# Patient Record
Sex: FEMALE | Race: WHITE | NOT HISPANIC OR LATINO | Employment: FULL TIME | URBAN - METROPOLITAN AREA
[De-identification: names, ages, dates, MRNs, and addresses within clinical notes are randomized per-mention and may not be internally consistent; named-entity substitution may affect disease eponyms.]

---

## 2024-04-17 SDOH — HEALTH STABILITY: PHYSICAL HEALTH: ON AVERAGE, HOW MANY DAYS PER WEEK DO YOU ENGAGE IN MODERATE TO STRENUOUS EXERCISE (LIKE A BRISK WALK)?: 3 DAYS

## 2024-04-17 SDOH — HEALTH STABILITY: PHYSICAL HEALTH: ON AVERAGE, HOW MANY MINUTES DO YOU ENGAGE IN EXERCISE AT THIS LEVEL?: 30 MIN

## 2024-04-17 ASSESSMENT — SOCIAL DETERMINANTS OF HEALTH (SDOH): HOW OFTEN DO YOU GET TOGETHER WITH FRIENDS OR RELATIVES?: ONCE A WEEK

## 2024-04-18 ENCOUNTER — OFFICE VISIT (OUTPATIENT)
Dept: FAMILY MEDICINE | Facility: CLINIC | Age: 24
End: 2024-04-18
Payer: COMMERCIAL

## 2024-04-18 VITALS
HEART RATE: 85 BPM | TEMPERATURE: 97.1 F | DIASTOLIC BLOOD PRESSURE: 80 MMHG | OXYGEN SATURATION: 98 % | SYSTOLIC BLOOD PRESSURE: 120 MMHG | BODY MASS INDEX: 24.08 KG/M2 | WEIGHT: 149.8 LBS | RESPIRATION RATE: 16 BRPM | HEIGHT: 66 IN

## 2024-04-18 DIAGNOSIS — Z53.20 HIV SCREENING DECLINED: ICD-10-CM

## 2024-04-18 DIAGNOSIS — N20.0 KIDNEY STONE: ICD-10-CM

## 2024-04-18 DIAGNOSIS — Z11.59 NEED FOR HEPATITIS C SCREENING TEST: ICD-10-CM

## 2024-04-18 DIAGNOSIS — N92.0 MENORRHAGIA WITH REGULAR CYCLE: ICD-10-CM

## 2024-04-18 DIAGNOSIS — Z00.00 ROUTINE GENERAL MEDICAL EXAMINATION AT A HEALTH CARE FACILITY: Primary | ICD-10-CM

## 2024-04-18 DIAGNOSIS — Z11.59 NEED FOR HEPATITIS B SCREENING TEST: ICD-10-CM

## 2024-04-18 DIAGNOSIS — Z13.220 SCREENING FOR HYPERLIPIDEMIA: ICD-10-CM

## 2024-04-18 PROCEDURE — 99385 PREV VISIT NEW AGE 18-39: CPT

## 2024-04-18 NOTE — PROGRESS NOTES
Preventive Care Visit  [facility]  JAUN Kapadia CNP, Family Medicine  Apr 18, 2024      Assessment & Plan     Routine general medical examination at a health care facility    Menorrhagia with regular cycle  Chronic/historical prior to being on birth control pt had very heavy and painful periods that often caused missing school. Was on ENEDELIA for many years, then nexplannon during college. Wants removal of nexplanon, she is getting  in May and not wanting to prevent future pregnancy.  Discussed her history of heavy painful, if pain full periods return and if  difficulty when trying for pregnancy, I recommend seeing  OB/GYN as things like fibroids, endometriosis, PCOS can cause symptoms and affect fertility.  - Hemoglobin    Kidney stone  Historical, multiple kidney stones in the past. No current symptoms. Will get labs since none prior in records.   - Comprehensive metabolic panel (BMP + Alb, Alk Phos, ALT, AST, Total. Bili, TP)    Screening for hyperlipidemia  Family history.  - Lipid panel reflex to direct LDL Non-fasting    HIV screening   Works in healthcare.  - HIV Antigen Antibody Combo    Need for hepatitis B screening test  Works in healthcare.  - Hepatitis B Surface Antibody    Need for hepatitis C screening test  Works in healthcare.  - Hepatitis C Screen Reflex to HCV RNA Quant and Genotype      Patient has been advised of split billing requirements and indicates understanding: Yes          Counseling  Appropriate preventive services were discussed with this patient, including applicable screening as appropriate for fall prevention, nutrition, physical activity, Tobacco-use cessation, weight loss and cognition.  Checklist reviewing preventive services available has been given to the patient.  Reviewed patient's diet, addressing concerns and/or questions.   She is at risk for lack of exercise and has been provided with information to increase physical activity for the  benefit of her well-being.       See Patient Instructions    Gideon Celeste is a 23 year old, presenting for the following:  Physical        4/18/2024     8:01 AM   Additional Questions   Roomed by Jesusita LEMUS MA        Via the Health Maintenance questionnaire, the patient has reported the following services have been completed -Cervical Cancer Screening, this information has been sent to the abstraction team.  Health Care Directive  Patient does not have a Health Care Directive or Living Will: Discussed advance care planning with patient; information given to patient to review.    HPI    Pap smear done 2022 in July, [place], [facility], normal results.  Pt has nexaplanon since July 2022, since last pap smear.    She's been on birth control since middle school and getting  in May so not needing to prevent pregnancy.     Discuss removing nexaplanon soon, notified provider does not remove.   Pt does not want to start another contraception.   Declines STI testing.           4/17/2024   General Health   How would you rate your overall physical health? Good   Feel stress (tense, anxious, or unable to sleep) To some extent   (!) STRESS CONCERN      4/17/2024   Nutrition   Three or more servings of calcium each day? (!) I DON'T KNOW   Diet: Regular (no restrictions)   How many servings of fruit and vegetables per day? (!) 2-3   How many sweetened beverages each day? 0-1         4/17/2024   Exercise   Days per week of moderate/strenous exercise 3 days   Average minutes spent exercising at this level 30 min         4/17/2024   Social Factors   Frequency of gathering with friends or relatives Once a week   Worry food won't last until get money to buy more No   Food not last or not have enough money for food? No   Do you have housing?  Yes   Are you worried about losing your housing? No   Lack of transportation? No   Unable to get utilities (heat,electricity)? No         4/17/2024   Dental   Dentist two times  every year? Yes         4/17/2024   TB Screening   Were you born outside of the [place]? No         Today's PHQ-2 Score:       4/17/2024     9:10 AM   PHQ-2 ( 1999 [employer])   Q1: Little interest or pleasure in doing things 1   Q2: Feeling down, depressed or hopeless 0   PHQ-2 Score 1   Q1: Little interest or pleasure in doing things Several days   Q2: Feeling down, depressed or hopeless Not at all   PHQ-2 Score 1           4/17/2024   Substance Use   Alcohol more than 3/day or more than 7/wk No   Do you use any other substances recreationally? No     Social History     Tobacco Use    Smoking status: Never    Smokeless tobacco: Never   Substance Use Topics    Alcohol use: Yes    Drug use: Never             4/17/2024   One time HIV Screening   Previous HIV test? No         4/17/2024   STI Screening   New sexual partner(s) since last STI/HIV test? No     History of abnormal Pap smear: NO - age 21-29 PAP every 3 years recommended             4/17/2024   Contraception/Family Planning   Questions about contraception or family planning (!) YES wondering if she needs to start hormonal birth control to wean off nexplanon.        Reviewed and updated as needed this visit by Provider        Surg Hx  Fam Hx   Sexual Activity          Past Medical History:   Diagnosis Date    Spondylolisthesis of lumbar region     spinal fusion 2014, 2016     Past Surgical History:   Procedure Laterality Date    ENT SURGERY  2016    SPINAL FUSION      L1 spondylolithesis     OB History   No obstetric history on file.     BP Readings from Last 3 Encounters:   04/18/24 120/80    Wt Readings from Last 3 Encounters:   04/18/24 67.9 kg (149 lb 12.8 oz)            Patient Active Problem List   Diagnosis    Spondylolisthesis at L5-S1 level    Kidney stone     Past Surgical History:   Procedure Laterality Date    ENT SURGERY  2016    SPINAL FUSION      L1 spondylolithesis       Social History     Tobacco Use    Smoking status: Never    Smokeless tobacco:  "Never   Substance Use Topics    Alcohol use: Yes     Family History   Problem Relation Age of Onset    Hypertension Father     Hyperlipidemia Father     Breast Cancer No family hx of     Colon Cancer No family hx of     Thyroid Disease No family hx of     Autoimmune Disease No family hx of          Current Outpatient Medications   Medication Sig Dispense Refill    etonogestrel (NEXPLANON) 68 MG IMPL Inject 1 each Subcutaneous once       Allergies   Allergen Reactions    Hydromorphone Itching    Morphine Itching     No lab results found.       Review of Systems  Constitutional, HEENT, cardiovascular, pulmonary, gi and gu systems are negative, except as otherwise noted.     Objective    Exam  /80 (BP Location: Left arm, Patient Position: Chair, Cuff Size: Adult Regular)   Pulse 85   Temp 97.1  F (36.2  C) (Oral)   Resp 16   Ht 1.676 m (5' 6\")   Wt 67.9 kg (149 lb 12.8 oz)   LMP 04/16/2024 (Exact Date)   SpO2 98%   Breastfeeding No   BMI 24.18 kg/m     Estimated body mass index is 24.18 kg/m  as calculated from the following:    Height as of this encounter: 1.676 m (5' 6\").    Weight as of this encounter: 67.9 kg (149 lb 12.8 oz).    Physical Exam  GENERAL: alert and no distress  NECK: no adenopathy, no asymmetry, masses, or scars  RESP: lungs clear to auscultation - no rales, rhonchi or wheezes  CV: regular rate and rhythm, normal S1 S2, no S3 or S4, no murmur, click or rub, no peripheral edema  ABDOMEN: soft, nontender, no hepatosplenomegaly, no masses and bowel sounds normal  MS: no gross musculoskeletal defects noted, no edema  SKIN: no suspicious lesions or rashes  NEURO: Normal strength and tone, mentation intact and speech normal  PSYCH: mentation appears normal, affect normal/bright        Signed Electronically by: JAUN Kapadia CNP    "

## 2024-04-18 NOTE — PATIENT INSTRUCTIONS
"Vance Celeste,     It was nice meeting you today. Here is a reminder of the plan we discussed:    ++ I forgot to have you fill out an CLARENCE form to get your previous records. Please ask the  for an CLARENCE form when you come in for your LAB appointment, fill out with you prior clinic info and we will send the request. ++    Schedule Nexplanon removal for June, usually booked a few months out because the appointment is booked for 60 minutes instead of standard 30.   - Dr. Herzog and Carmen Yee are family practice providers who provide this service. You do not need a separate consult prior unless you wanted it replaced.    Labs - schedule a lab appointment to get blood drawn. The results will be on [email or web address].     Reminders for future visits:  E-visit = online questionnaire only, we reply within 24 hours if submitted on a weekday. Great for quick things like UTI, yeast/BV, wanting strep test, sinus infection etc.   Virtual = phone or video visit, usually we can put some orders in, discuss a workup plan, and have you follow up in person if needed. We have appointments available within 1-2 weeks.  Nurse triage = if you have urgent or new/concerning symptoms you can call the main line, ask to speak with a nurse, they will review your symptoms and help you get an appointment within an appropriate time frame (we have \"same-day\" and \"next-day\" appointments held every week for nurses to schedule) or direct you to urgent care/ER if needed based on situation.          Preventive Care Advice   This is general advice given by our system to help you stay healthy. However, your care team may have specific advice just for you. Please talk to your care team about your preventive care needs.  Nutrition  Eat 5 or more servings of fruits and vegetables each day.  Try wheat bread, brown rice and whole grain pasta (instead of white bread, rice, and pasta).  Get enough calcium and vitamin D. Check the label on foods and aim for 100% of " the RDA (recommended daily allowance).  Lifestyle  Exercise at least 150 minutes each week   (30 minutes a day, 5 days a week).  Do muscle strengthening activities 2 days a week. These help control your weight and prevent disease.  No smoking.  Wear sunscreen to prevent skin cancer.  Have a dental exam and cleaning every 6 months.  Yearly exams  See your health care team every year to talk about:  Any changes in your health.  Any medicines your care team has prescribed.  Preventive care, family planning, and ways to prevent chronic diseases.  Shots (vaccines)   HPV shots (up to age 26), if you've never had them before.  Hepatitis B shots (up to age 59), if you've never had them before.  COVID-19 shot: Get this shot when it's due.  Flu shot: Get a flu shot every year.  Tetanus shot: Get a tetanus shot every 10 years.  Pneumococcal, hepatitis A, and RSV shots: Ask your care team if you need these based on your risk.  Shingles shot (for age 50 and up).  General health tests  Diabetes screening:  Starting at age 35, Get screened for diabetes at least every 3 years.  If you are younger than age 35, ask your care team if you should be screened for diabetes.  Cholesterol test: At age 39, start having a cholesterol test every 5 years, or more often if advised.  Bone density scan (DEXA): At age 50, ask your care team if you should have this scan for osteoporosis (brittle bones).  Hepatitis C: Get tested at least once in your life.  STIs (sexually transmitted infections)  Before age 24: Ask your care team if you should be screened for STIs.  After age 24: Get screened for STIs if you're at risk. You are at risk for STIs (including HIV) if:  You are sexually active with more than one person.  You don't use condoms every time.  You or a partner was diagnosed with a sexually transmitted infection.  If you are at risk for HIV, ask about PrEP medicine to prevent HIV.  Get tested for HIV at least once in your life, whether you are  at risk for HIV or not.  Cancer screening tests  Cervical cancer screening: If you have a cervix, begin getting regular cervical cancer screening tests at age 21. Most people who have regular screenings with normal results can stop after age 65. Talk about this with your provider.  Breast cancer scan (mammogram): If you've ever had breasts, begin having regular mammograms starting at age 40. This is a scan to check for breast cancer.  Colon cancer screening: It is important to start screening for colon cancer at age 45.  Have a colonoscopy test every 10 years (or more often if you're at risk) Or, ask your provider about stool tests like a FIT test every year or Cologuard test every 3 years.  To learn more about your testing options, visit: https://www.Toxic Attire/052395.pdf.  For help making a decision, visit: https://Cuedd.Baydin/lx98284.  Prostate cancer screening test: If you have a prostate and are age 55 to 69, ask your provider if you would benefit from a yearly prostate cancer screening test.  Lung cancer screening: If you are a current or former smoker age 50 to 80, ask your care team if ongoing lung cancer screenings are right for you.  For informational purposes only. Not to replace the advice of your health care provider. Copyright   2023 [facility]. All rights reserved. Clinically reviewed by the [facility]. [employer] 420495 - REV 01/24.    Learning About Stress  What is stress?     Stress is your body's response to a hard situation. Your body can have a physical, emotional, or mental response. Stress is a fact of life for most people, and it affects everyone differently. What causes stress for you may not be stressful for someone else.  A lot of things can cause stress. You may feel stress when you go on a job interview, take a test, or run a race. This kind of short-term stress is normal and even useful. It can help you if you need to work hard or react quickly. For  example, stress can help you finish an important job on time.  Long-term stress is caused by ongoing stressful situations or events. Examples of long-term stress include long-term health problems, ongoing problems at work, or conflicts in your family. Long-term stress can harm your health.  How does stress affect your health?  When you are stressed, your body responds as though you are in danger. It makes hormones that speed up your heart, make you breathe faster, and give you a burst of energy. This is called the fight-or-flight stress response. If the stress is over quickly, your body goes back to normal and no harm is done.  But if stress happens too often or lasts too long, it can have bad effects. Long-term stress can make you more likely to get sick, and it can make symptoms of some diseases worse. If you tense up when you are stressed, you may develop neck, shoulder, or low back pain. Stress is linked to high blood pressure and heart disease.  Stress also harms your emotional health. It can make you patterson, tense, or depressed. Your relationships may suffer, and you may not do well at work or school.  What can you do to manage stress?  You can try these things to help manage stress:   Do something active. Exercise or activity can help reduce stress. Walking is a great way to get started. Even everyday activities such as housecleaning or yard work can help.  Try yoga or kevin chi. These techniques combine exercise and meditation. You may need some training at first to learn them.  Do something you enjoy. For example, listen to music or go to a movie. Practice your hobby or do volunteer work.  Meditate. This can help you relax, because you are not worrying about what happened before or what may happen in the future.  Do guided imagery. Imagine yourself in any setting that helps you feel calm. You can use online videos, books, or a teacher to guide you.  Do breathing exercises. For example:  From a standing  "position, bend forward from the waist with your knees slightly bent. Let your arms dangle close to the floor.  Breathe in slowly and deeply as you return to a standing position. Roll up slowly and lift your head last.  Hold your breath for just a few seconds in the standing position.  Breathe out slowly and bend forward from the waist.  Let your feelings out. Talk, laugh, cry, and express anger when you need to. Talking with supportive friends or family, a counselor, or a martin leader about your feelings is a healthy way to relieve stress. Avoid discussing your feelings with people who make you feel worse.  Write. It may help to write about things that are bothering you. This helps you find out how much stress you feel and what is causing it. When you know this, you can find better ways to cope.  What can you do to prevent stress?  You might try some of these things to help prevent stress:  Manage your time. This helps you find time to do the things you want and need to do.  Get enough sleep. Your body recovers from the stresses of the day while you are sleeping.  Get support. Your family, friends, and community can make a difference in how you experience stress.  Limit your news feed. Avoid or limit time on social media or news that may make you feel stressed.  Do something active. Exercise or activity can help reduce stress. Walking is a great way to get started.  Where can you learn more?  Go to https://www.Vanderdroid.net/patiented  Enter N032 in the search box to learn more about \"Learning About Stress.\"  Current as of: October 24, 2023               Content Version: 14.0    6801-9145 [employer].   Care instructions adapted under license by your healthcare professional. If you have questions about a medical condition or this instruction, always ask your healthcare professional. [employer] disclaims any warranty or liability for your use of this information.      "

## 2024-04-19 ENCOUNTER — LAB (OUTPATIENT)
Dept: LAB | Facility: CLINIC | Age: 24
End: 2024-04-19
Payer: COMMERCIAL

## 2024-04-19 DIAGNOSIS — Z11.59 NEED FOR HEPATITIS C SCREENING TEST: ICD-10-CM

## 2024-04-19 DIAGNOSIS — Z53.20 HIV SCREENING DECLINED: ICD-10-CM

## 2024-04-19 DIAGNOSIS — Z11.3 SCREENING FOR STDS (SEXUALLY TRANSMITTED DISEASES): Primary | ICD-10-CM

## 2024-04-19 DIAGNOSIS — N92.0 MENORRHAGIA WITH REGULAR CYCLE: ICD-10-CM

## 2024-04-19 DIAGNOSIS — Z11.59 NEED FOR HEPATITIS B SCREENING TEST: ICD-10-CM

## 2024-04-19 DIAGNOSIS — N20.0 KIDNEY STONE: ICD-10-CM

## 2024-04-19 DIAGNOSIS — Z13.220 SCREENING FOR HYPERLIPIDEMIA: ICD-10-CM

## 2024-04-19 LAB — HGB BLD-MCNC: 14.2 G/DL (ref 11.7–15.7)

## 2024-04-19 PROCEDURE — 86706 HEP B SURFACE ANTIBODY: CPT

## 2024-04-19 PROCEDURE — 87591 N.GONORRHOEAE DNA AMP PROB: CPT

## 2024-04-19 PROCEDURE — 36415 COLL VENOUS BLD VENIPUNCTURE: CPT

## 2024-04-19 PROCEDURE — 85018 HEMOGLOBIN: CPT

## 2024-04-19 PROCEDURE — 80061 LIPID PANEL: CPT

## 2024-04-19 PROCEDURE — 87389 HIV-1 AG W/HIV-1&-2 AB AG IA: CPT

## 2024-04-19 PROCEDURE — 86803 HEPATITIS C AB TEST: CPT

## 2024-04-19 PROCEDURE — 87491 CHLMYD TRACH DNA AMP PROBE: CPT

## 2024-04-19 PROCEDURE — 80053 COMPREHEN METABOLIC PANEL: CPT

## 2024-04-20 LAB
C TRACH DNA SPEC QL PROBE+SIG AMP: NEGATIVE
N GONORRHOEA DNA SPEC QL NAA+PROBE: NEGATIVE

## 2024-04-21 LAB
ALBUMIN SERPL BCG-MCNC: 4.5 G/DL (ref 3.5–5.2)
ALP SERPL-CCNC: 84 U/L (ref 40–150)
ALT SERPL W P-5'-P-CCNC: 14 U/L (ref 0–50)
ANION GAP SERPL CALCULATED.3IONS-SCNC: 12 MMOL/L (ref 7–15)
AST SERPL W P-5'-P-CCNC: 14 U/L (ref 0–45)
BILIRUB SERPL-MCNC: 0.7 MG/DL
BUN SERPL-MCNC: 15.7 MG/DL (ref 6–20)
CALCIUM SERPL-MCNC: 9.3 MG/DL (ref 8.6–10)
CHLORIDE SERPL-SCNC: 105 MMOL/L (ref 98–107)
CHOLEST SERPL-MCNC: 157 MG/DL
CREAT SERPL-MCNC: 0.92 MG/DL (ref 0.51–0.95)
DEPRECATED HCO3 PLAS-SCNC: 23 MMOL/L (ref 22–29)
EGFRCR SERPLBLD CKD-EPI 2021: 89 ML/MIN/1.73M2
FASTING STATUS PATIENT QL REPORTED: YES
GLUCOSE SERPL-MCNC: 86 MG/DL (ref 70–99)
HBV SURFACE AB SERPL IA-ACNC: >1000 M[IU]/ML
HBV SURFACE AB SERPL IA-ACNC: REACTIVE M[IU]/ML
HCV AB SERPL QL IA: NONREACTIVE
HDLC SERPL-MCNC: 46 MG/DL
HIV 1+2 AB+HIV1 P24 AG SERPL QL IA: NONREACTIVE
LDLC SERPL CALC-MCNC: 93 MG/DL
NONHDLC SERPL-MCNC: 111 MG/DL
POTASSIUM SERPL-SCNC: 4.4 MMOL/L (ref 3.4–5.3)
PROT SERPL-MCNC: 6.9 G/DL (ref 6.4–8.3)
SODIUM SERPL-SCNC: 140 MMOL/L (ref 135–145)
TRIGL SERPL-MCNC: 92 MG/DL

## 2024-06-20 ENCOUNTER — OFFICE VISIT (OUTPATIENT)
Dept: OBGYN | Facility: CLINIC | Age: 24
End: 2024-06-20
Payer: COMMERCIAL

## 2024-06-20 VITALS
BODY MASS INDEX: 25.02 KG/M2 | HEART RATE: 84 BPM | WEIGHT: 155 LBS | SYSTOLIC BLOOD PRESSURE: 127 MMHG | TEMPERATURE: 98.1 F | DIASTOLIC BLOOD PRESSURE: 89 MMHG

## 2024-06-20 DIAGNOSIS — Z30.46 ENCOUNTER FOR NEXPLANON REMOVAL: Primary | ICD-10-CM

## 2024-06-20 PROCEDURE — 11982 REMOVE DRUG IMPLANT DEVICE: CPT | Performed by: OBSTETRICS & GYNECOLOGY

## 2024-06-20 NOTE — PROGRESS NOTES
Nexplanon Removal:     Is a pregnancy test required: No.  Was a consent obtained?  Yes    Roula Vallejo is here for removal of etonogestrel implant Nexplanon/Implanon    Indication: desires change in method - NFP. Ok with pregnancy if it happens. Will start PNV.      Preoperative Diagnosis: etonogestrel implant  Postoperative Diagnosis: etonogestrel implant removed    Technique: On the left arm  Skin prep Betadine  Anesthesia 1% lidocaine  Procedure: Small incision (<5mm) was made at distal end of palpable implant, scalpel tip was used to isolate the implant and bring it to the incision, the fibrous capsule containing the implant  was incised and the Implant was removed intact.      EBL: minimal  Complications:  No  Tolerance:  Pt tolerated procedure well and was in stable condition.   Dressing:    A pressure bandage was placed for the next 12-24 hours.    Contraception was discussed and patient chose the following method natural family planning      Follow up: Pt was instructed to call if bleeding, severe pain or foul smell.     Ana Barr MD

## 2024-11-26 ENCOUNTER — VIRTUAL VISIT (OUTPATIENT)
Dept: OBGYN | Facility: CLINIC | Age: 24
End: 2024-11-26
Payer: COMMERCIAL

## 2024-11-26 VITALS — BODY MASS INDEX: 25.02 KG/M2 | HEIGHT: 66 IN

## 2024-11-26 DIAGNOSIS — Z23 NEED FOR DIPHTHERIA-TETANUS-PERTUSSIS (TDAP) VACCINE: ICD-10-CM

## 2024-11-26 DIAGNOSIS — Z34.00 ENCOUNTER FOR SUPERVISION OF NORMAL FIRST PREGNANCY: Primary | ICD-10-CM

## 2024-11-26 NOTE — PROGRESS NOTES
Important Information for Provider:     New ob nurse intake by phone, first pregnancy. Handouts reviewed. Discussed genetic screening. Ultrasound and NOB with CNM 12/11/2024  Discussed normal pregnancy symptoms.      Caffeine intake/servings daily - 1  Calcium intake/servings daily - 3  Exercise 5 times weekly - describe ; jogs, precautions given  Sunscreen used - Yes  Seatbelts used - Yes  Self Breast Exam - Yes  Pap test up to date -  Yes  Dental exam up to date -  Yes  Immunizations reviewed and up to date - Yes  Abuse: Current or Past (Physical, Sexual or Emotional) - No  Do you feel safe in your environment - Yes  Do you cope well with stress - Yes  Do you suffer from insomnia - Yes            Prenatal OB Questionnaire  Patient supplied answers from flow sheet for:  Prenatal OB Questionnaire.  Past Medical History  Have you ever recieved care for your mental health? : (!) (Patient-Rptd) Yes  Have you ever been in a major accident or suffered serious trauma?: (Patient-Rptd) No  Within the last year, has anyone hit, slapped, kicked or otherwise hurt you?: (Patient-Rptd) No  In the last year, has anyone forced you to have sex when you didn't want to?: (Patient-Rptd) No    Past Medical History 2   Have you ever received a blood transfusion?: (Patient-Rptd) No  Would you accept a blood transfusion if was medically recommended?: (Patient-Rptd) Yes  Does anyone in your home smoke?: (Patient-Rptd) No   Is your blood type Rh negative?: (Patient-Rptd) Unknown  Have you ever ?: (Patient-Rptd) No  Have you been hospitalized for a nonsurgical reason excluding normal delivery?: (Patient-Rptd) No  Have you ever had an abnormal pap smear?: (Patient-Rptd) No    Past Medical History (Continued)  Do you have a history of abnormalities of the uterus?: (Patient-Rptd) No  Did your mother take JUAN CARLOS or any other hormones when she was pregnant with you?: (Patient-Rptd) Unknown  Do you have any other problems we have not asked  about which you feel may be important to this pregnancy?: (Patient-Rptd) No                     Allergies as of 11/26/2024:    Allergies as of 11/26/2024 - Reviewed 11/26/2024   Allergen Reaction Noted    Hydromorphone Itching 12/27/2016    Morphine Itching 12/27/2016               Early ultrasound screening tool:    Does patient have irregular periods?  No  Did patient use hormonal birth control in the three months prior to positive urine pregnancy test? No  Is the patient breastfeeding?  No  Is the patient 10 weeks or greater at time of education visit?  No

## 2024-12-10 LAB
ABO/RH(D): ABNORMAL
ANTIBODY SCREEN: POSITIVE
SPECIMEN EXPIRATION DATE: ABNORMAL

## 2024-12-10 ASSESSMENT — EDINBURGH POSTNATAL DEPRESSION SCALE (EPDS)
I HAVE BLAMED MYSELF UNNECESSARILY WHEN THINGS WENT WRONG: NOT VERY OFTEN
I HAVE BEEN SO UNHAPPY THAT I HAVE HAD DIFFICULTY SLEEPING: NOT VERY OFTEN
I HAVE FELT SCARED OR PANICKY FOR NO GOOD REASON: YES, QUITE A LOT
I HAVE FELT SAD OR MISERABLE: NO, NOT AT ALL
THINGS HAVE BEEN GETTING ON TOP OF ME: YES, SOMETIMES I HAVEN'T BEEN COPING AS WELL AS USUAL
THE THOUGHT OF HARMING MYSELF HAS OCCURRED TO ME: NEVER
I HAVE BEEN ANXIOUS OR WORRIED FOR NO GOOD REASON: YES, VERY OFTEN
I HAVE BEEN SO UNHAPPY THAT I HAVE BEEN CRYING: NO, NEVER
I HAVE BEEN ABLE TO LAUGH AND SEE THE FUNNY SIDE OF THINGS: AS MUCH AS I ALWAYS COULD
I HAVE LOOKED FORWARD WITH ENJOYMENT TO THINGS: AS MUCH AS I EVER DID
TOTAL SCORE: 10

## 2024-12-11 ENCOUNTER — TRANSCRIBE ORDERS (OUTPATIENT)
Dept: MATERNAL FETAL MEDICINE | Facility: CLINIC | Age: 24
End: 2024-12-11

## 2024-12-11 ENCOUNTER — PRENATAL OFFICE VISIT (OUTPATIENT)
Dept: MIDWIFE SERVICES | Facility: CLINIC | Age: 24
End: 2024-12-11
Payer: COMMERCIAL

## 2024-12-11 ENCOUNTER — ANCILLARY PROCEDURE (OUTPATIENT)
Dept: ULTRASOUND IMAGING | Facility: CLINIC | Age: 24
End: 2024-12-11
Payer: COMMERCIAL

## 2024-12-11 VITALS
WEIGHT: 154.8 LBS | SYSTOLIC BLOOD PRESSURE: 130 MMHG | HEART RATE: 90 BPM | OXYGEN SATURATION: 98 % | BODY MASS INDEX: 24.99 KG/M2 | DIASTOLIC BLOOD PRESSURE: 90 MMHG

## 2024-12-11 DIAGNOSIS — Z34.00 ENCOUNTER FOR SUPERVISION OF NORMAL FIRST PREGNANCY: ICD-10-CM

## 2024-12-11 DIAGNOSIS — Z34.91 PRENATAL CARE IN FIRST TRIMESTER: Primary | ICD-10-CM

## 2024-12-11 DIAGNOSIS — O26.90 PREGNANCY RELATED CONDITION, ANTEPARTUM: Primary | ICD-10-CM

## 2024-12-11 DIAGNOSIS — F41.9 ANXIETY: ICD-10-CM

## 2024-12-11 DIAGNOSIS — R03.0 ELEVATED BLOOD PRESSURE READING WITHOUT DIAGNOSIS OF HYPERTENSION: ICD-10-CM

## 2024-12-11 LAB
ALBUMIN UR-MCNC: NEGATIVE MG/DL
APPEARANCE UR: CLEAR
BILIRUB UR QL STRIP: NEGATIVE
COLOR UR AUTO: YELLOW
ERYTHROCYTE [DISTWIDTH] IN BLOOD BY AUTOMATED COUNT: 11.9 % (ref 10–15)
EST. AVERAGE GLUCOSE BLD GHB EST-MCNC: 105 MG/DL
GLUCOSE UR STRIP-MCNC: NEGATIVE MG/DL
HBA1C MFR BLD: 5.3 % (ref 0–5.6)
HBV SURFACE AG SERPL QL IA: NONREACTIVE
HCT VFR BLD AUTO: 39.9 % (ref 35–47)
HCV AB SERPL QL IA: NONREACTIVE
HGB BLD-MCNC: 14.5 G/DL (ref 11.7–15.7)
HGB UR QL STRIP: NEGATIVE
HIV 1+2 AB+HIV1 P24 AG SERPL QL IA: NONREACTIVE
KETONES UR STRIP-MCNC: NEGATIVE MG/DL
LEUKOCYTE ESTERASE UR QL STRIP: ABNORMAL
MCH RBC QN AUTO: 31.3 PG (ref 26.5–33)
MCHC RBC AUTO-ENTMCNC: 36.3 G/DL (ref 31.5–36.5)
MCV RBC AUTO: 86 FL (ref 78–100)
NITRATE UR QL: NEGATIVE
PH UR STRIP: 5.5 [PH] (ref 5–7)
PLATELET # BLD AUTO: 231 10E3/UL (ref 150–450)
RBC # BLD AUTO: 4.63 10E6/UL (ref 3.8–5.2)
RUBV IGG SERPL QL IA: 5.73 INDEX
RUBV IGG SERPL QL IA: POSITIVE
SP GR UR STRIP: <=1.005 (ref 1–1.03)
T PALLIDUM AB SER QL: NONREACTIVE
UROBILINOGEN UR STRIP-ACNC: 0.2 E.U./DL
WBC # BLD AUTO: 10.8 10E3/UL (ref 4–11)

## 2024-12-11 PROCEDURE — 76817 TRANSVAGINAL US OBSTETRIC: CPT | Performed by: OBSTETRICS & GYNECOLOGY

## 2024-12-11 NOTE — PROGRESS NOTES
is a partnered  1 para 0 of  giving an EDC by Constance's rule of 25 who presents for a new OB Visit.   She has not had bleeding since her LMP.  She has not had nausea.. Weigh loss   has not occurred.. She denies fever, chills and viral infections since her last LMP.  There is mildfatigue.  This was a planned pregnancy.  She is not a previous CNM patient.  FOANURAG says that he has a history of juvenile arthritis and other autoimmune diseases in his family.  She reports having anxiety. She has taking medication in past.  She denies a history of hypertension but thinks that she could have white coat syndrome.   =========================================    INFECTION HISTORY  HIV: no  Hepatitis B: no  Hepatitis C: no  Tuberculosis: no    Herpes self: no  Herpes partner:  no   Chlamydia: no, declines testing - recent negative test  Gonorrhea: no, declines testing - recent negative test  Trichomonis: no  Syphilis:  no  ============================================    Low Dose Aspirin for Preeclampsia Prevention  Assessment:    Consider for those with 1 high risk or 2  moderate risk factors      High risk: previous pregnancy with preeclampsia, multifetal gestation, chronic htn, diabetes, chronic kidney disease, autoimmune disorder      Medium risk: nulliparity, BMI >30, family hx preeclampsia, age >/= 35,  race, low SES, personal risk factors (hx low birth weight, hx stillbirth, >10yrs between pregnancies).      Patient does qualify for low dose aspirin therapy    PERSONAL/SOCIAL HISTORY  Lives with partner.  Employment: Full time - RN night shift   =============================================    REVIEW OF SYSTEMS  CONSTITUTIONAL: Denies fever, chills and night sweats  DIET: Appetite is continue.  Eats a regular.   Plans to gain 25-35 pounds with her pregnancy.  SKIN: Denies changes and suspicious moles or lesions.  ENT: Denies blurred vision, hearing loss, tinnitus, frequent colds, epistaxis,  hoarseness and tooth pain.    ENDOCRINE: Denies heat and cold intolerance, and polydypsia.    BREASTS:  Tender since LMP.  Denies discharge and lumps.   HEART/LUNGS: Denies dyspnea, wheezing, coughing and chest pain.  HEMATOLOGIC: Denies tendency to bruise and history of blood clots.  GASTROINTESTINAL: Denies heartburn, indigestion and change of color in stools.    GENITOURINARY:  Denies urgency, dysuria and hematuria.  Has frequency of urination since LMP.   NEUROLOGIC:  Denies seizures, weakness and fainting.  PSYCHIATRIC:  Reports a history of anxiety and current struggles with anxiety.    ===========================================    PHYSICAL EXAM  GENERAL:   pleasant pregnant female, alert, cooperative and well groomed.  SKIN:  Warm and dry, without lesions or tenderness.    HEAD: Symmetrical features.  EYES:  PERRLA  EARS: Tympanic membranes gray, translucent and intact.  NOSE: No flaring, patent  MOUTH:  Buccal mucosa pink, moist without lesions.  Teeth in good repair.    NECK:  Thyroid without enlargement and nodules.  Lymph nodes not palpable.   LUNGS:  Clear to auscultation.  BREAST:  Symmetrical without lesions or nodes.  Nipples everted.  Areolas symmetric.  No palpable axillary nodes.  HEART:  RRR without murmur.  ABDOMEN: Soft without masses or tenderness.  No CVA tenderness.  Uterus palpable at size equal to dates.   MUSCULOSKELETAL:  Full range of motion  GENITALIA:  Deferred   EXTREMITIES:  2+/2+ DTR, No edema. No significant varicosities.  ===================================================    PLAN:  Elevated BP - possible chronic hypertension, continue to assess.    Labs today.    Reviewed preliminary US results.  Discussed anxiety.  She declines any referrals for therapy and or medication.  She said that she will let us know if she needs assistance.   Instructed on use of triage nurse line and contacting the on call CNM after hours in an emergency.    Discussed the indications, uses for and  false positives for genetic testing and fetal survey ultrasounds at 18-20 weeks gestation.  They do want M First Trimester screen. Ordered.   Will return to the clinic in 4 weeks for her next routine prenatal check.  Will call to be seen sooner if problem arise.  Oriented to CNM Service and added to list.        Betsy Gurrola, ALESSIO, APRN, CNM

## 2024-12-12 LAB
ANTIBODY SCREEN, TUBE: NORMAL
ANTIBODY UNIDENTIFIED: NORMAL
BACTERIA UR CULT: NO GROWTH
SPECIMEN EXPIRATION DATE: NORMAL
SPECIMEN EXPIRATION DATE: NORMAL

## 2024-12-23 ENCOUNTER — NURSE TRIAGE (OUTPATIENT)
Dept: FAMILY MEDICINE | Facility: CLINIC | Age: 24
End: 2024-12-23

## 2024-12-23 ENCOUNTER — TELEPHONE (OUTPATIENT)
Dept: MIDWIFE SERVICES | Facility: CLINIC | Age: 24
End: 2024-12-23

## 2024-12-23 ENCOUNTER — OFFICE VISIT (OUTPATIENT)
Dept: MIDWIFE SERVICES | Facility: CLINIC | Age: 24
End: 2024-12-23
Payer: COMMERCIAL

## 2024-12-23 VITALS
HEART RATE: 109 BPM | DIASTOLIC BLOOD PRESSURE: 88 MMHG | OXYGEN SATURATION: 97 % | WEIGHT: 160.5 LBS | BODY MASS INDEX: 25.91 KG/M2 | SYSTOLIC BLOOD PRESSURE: 132 MMHG

## 2024-12-23 DIAGNOSIS — N76.0 BACTERIAL VAGINOSIS: ICD-10-CM

## 2024-12-23 DIAGNOSIS — N89.8 VAGINAL DISCHARGE: ICD-10-CM

## 2024-12-23 DIAGNOSIS — R30.0 DYSURIA: ICD-10-CM

## 2024-12-23 DIAGNOSIS — B96.89 BACTERIAL VAGINOSIS: ICD-10-CM

## 2024-12-23 DIAGNOSIS — Z34.01 ENCOUNTER FOR SUPERVISION OF NORMAL FIRST PREGNANCY IN FIRST TRIMESTER: Primary | ICD-10-CM

## 2024-12-23 LAB
ALBUMIN UR-MCNC: NEGATIVE MG/DL
APPEARANCE UR: CLEAR
BACTERIA #/AREA URNS HPF: ABNORMAL /HPF
BILIRUB UR QL STRIP: NEGATIVE
CLUE CELLS: PRESENT
COLOR UR AUTO: YELLOW
GLUCOSE UR STRIP-MCNC: NEGATIVE MG/DL
HGB UR QL STRIP: ABNORMAL
KETONES UR STRIP-MCNC: NEGATIVE MG/DL
LEUKOCYTE ESTERASE UR QL STRIP: ABNORMAL
NITRATE UR QL: NEGATIVE
PH UR STRIP: 6.5 [PH] (ref 5–7)
RBC #/AREA URNS AUTO: ABNORMAL /HPF
SP GR UR STRIP: <=1.005 (ref 1–1.03)
SQUAMOUS #/AREA URNS AUTO: ABNORMAL /LPF
TRICHOMONAS, WET PREP: ABNORMAL
UROBILINOGEN UR STRIP-ACNC: 0.2 E.U./DL
WBC #/AREA URNS AUTO: ABNORMAL /HPF
WBC'S/HIGH POWER FIELD, WET PREP: ABNORMAL
YEAST, WET PREP: ABNORMAL

## 2024-12-23 PROCEDURE — 87210 SMEAR WET MOUNT SALINE/INK: CPT | Performed by: ADVANCED PRACTICE MIDWIFE

## 2024-12-23 PROCEDURE — 81001 URINALYSIS AUTO W/SCOPE: CPT | Performed by: ADVANCED PRACTICE MIDWIFE

## 2024-12-23 RX ORDER — METRONIDAZOLE 500 MG/1
500 TABLET ORAL 2 TIMES DAILY
Qty: 14 TABLET | Refills: 0 | Status: SHIPPED | OUTPATIENT
Start: 2024-12-23 | End: 2024-12-23

## 2024-12-23 RX ORDER — METRONIDAZOLE 500 MG/1
500 TABLET ORAL 2 TIMES DAILY
Qty: 14 TABLET | Refills: 0 | Status: SHIPPED | OUTPATIENT
Start: 2024-12-23

## 2024-12-23 RX ORDER — VITAMIN A, VITAMIN C, VITAMIN D-3, VITAMIN E, VITAMIN B-1, VITAMIN B-2, NIACIN, VITAMIN B-6, CALCIUM, IRON, ZINC, COPPER 4000; 120; 400; 22; 1.84; 3; 20; 10; 1; 12; 200; 27; 25; 2 [IU]/1; MG/1; [IU]/1; MG/1; MG/1; MG/1; MG/1; MG/1; MG/1; UG/1; MG/1; MG/1; MG/1; MG/1
TABLET ORAL DAILY
COMMUNITY

## 2024-12-23 NOTE — TELEPHONE ENCOUNTER
Requested Prescriptions   Pending Prescriptions Disp Refills    metroNIDAZOLE (FLAGYL) 500 MG tablet 14 tablet 0     Sig: Take 1 tablet (500 mg) by mouth 2 times daily.       There is no refill protocol information for this order        Pt updated Rx sent for +BV  Pt out of town for the holiday sent to preferred pharmacy  Pt verbalized understanding, in agreement with plan, and voiced no further questions.  Nithya Terry RN on 12/23/2024 at 3:39 PM   [facility]

## 2024-12-23 NOTE — PROGRESS NOTES
11w2d  Roula is here today for acute visit due to brown discharge this morning. Roula endorses brown discharge about the size of a quarter when wiping with toilet paper this morning. She denies bright red bleeding, cramping, or other concerns. Fetal cardiac activity and movement visualized with BSUS today. Roula endorses some dysuria and increased vaginal discharge. Wet prep and UA today. Wet prep positive for clue cells indicating bacterial vaginosis. Rx for metronidazole 500mg BID x7d.    JAUN Francis CNM

## 2025-01-06 ENCOUNTER — LAB (OUTPATIENT)
Dept: LAB | Facility: CLINIC | Age: 25
End: 2025-01-06
Attending: STUDENT IN AN ORGANIZED HEALTH CARE EDUCATION/TRAINING PROGRAM
Payer: COMMERCIAL

## 2025-01-06 ENCOUNTER — HOSPITAL ENCOUNTER (OUTPATIENT)
Dept: ULTRASOUND IMAGING | Facility: CLINIC | Age: 25
Discharge: HOME | End: 2025-01-06
Attending: STUDENT IN AN ORGANIZED HEALTH CARE EDUCATION/TRAINING PROGRAM
Payer: COMMERCIAL

## 2025-01-06 ENCOUNTER — OFFICE VISIT (OUTPATIENT)
Dept: MATERNAL FETAL MEDICINE | Facility: CLINIC | Age: 25
End: 2025-01-06
Attending: STUDENT IN AN ORGANIZED HEALTH CARE EDUCATION/TRAINING PROGRAM
Payer: COMMERCIAL

## 2025-01-06 ENCOUNTER — MEDICAL CORRESPONDENCE (OUTPATIENT)
Dept: MATERNAL FETAL MEDICINE | Facility: CLINIC | Age: 25
End: 2025-01-06

## 2025-01-06 DIAGNOSIS — Z36.9 ENCOUNTER FOR ANTENATAL SCREENING OF MOTHER: ICD-10-CM

## 2025-01-06 DIAGNOSIS — Z34.01 ENCOUNTER FOR SUPERVISION OF NORMAL FIRST PREGNANCY IN FIRST TRIMESTER: Primary | ICD-10-CM

## 2025-01-06 DIAGNOSIS — O26.90 PREGNANCY RELATED CONDITION, ANTEPARTUM: ICD-10-CM

## 2025-01-06 DIAGNOSIS — Z36.9 FIRST TRIMESTER SCREENING: ICD-10-CM

## 2025-01-06 DIAGNOSIS — Z34.01 ENCOUNTER FOR SUPERVISION OF NORMAL FIRST PREGNANCY IN FIRST TRIMESTER: ICD-10-CM

## 2025-01-06 DIAGNOSIS — O26.90 PREGNANCY RELATED CONDITION, ANTEPARTUM: Primary | ICD-10-CM

## 2025-01-06 DIAGNOSIS — Z82.71 FAMILY HISTORY OF POLYCYSTIC KIDNEY DISEASE: ICD-10-CM

## 2025-01-06 PROCEDURE — 96041 GENETIC COUNSELING SVC EA 30: CPT

## 2025-01-06 PROCEDURE — 36415 COLL VENOUS BLD VENIPUNCTURE: CPT

## 2025-01-06 PROCEDURE — 76813 OB US NUCHAL MEAS 1 GEST: CPT

## 2025-01-06 NOTE — PROGRESS NOTES
Please refer to ultrasound report under 'Imaging' Studies of 'Chart Review' tabs.    Clement Moreno M.D.

## 2025-01-06 NOTE — PROGRESS NOTES
[facility]  Genetic Counseling Consult    Patient:  Roula Vallejo  Preferred Name: Roula Umaña YOB: 2000   Date of Service:  25   MRN: [id number]    Roula Umaña was seen at the [facility] [place] for genetic consultation. The indication for genetic counseling is desire to discuss options for genetic screening and diagnostics. The patient was accompanied to this visit by their partner, Denver.    The session was conducted in English.      IMPRESSION/ PLAN   1. Roula has not had genetic screening in this pregnancy but elected to have screening today.     2. During today's [facility] visit, Roula had a blood draw for non-invasive prenatal testing (also called NIPT, NIPS, or cell-free DNA) through [facility]). This NIPT screens for trisomy 21, 18, and 13 and the patient opted to screen for sex chromosome aneuploidies, including reported fetal sex. Results are expected in 7-10 days. The patient will be called with results and if they do not answer they requested a detailed message with results on their voicemail, NOT including the predicted fetal sex information. Instead, they would like the sex information available on [employer] to view. Patient was informed that results, including fetal sex, will be available in [employer].    3. Since the patient chose aneuploidy screening via NIPT, quad screen is NOT recommended in the second trimester. If the patient desires screening for open neural tube defects, maternal serum AFP only is recommended, ideally between 16-18 weeks gestation.    4. Roula had a nuchal translucency ultrasound today. Please see the ultrasound report for further details.    5. Further recommendations include a fetal anatomy ultrasound around 18-20 weeks, which will most likely be completed with their primary OB provider.    PREGNANCY HISTORY   /Parity:       This is Roula's first  "pregnancy.    CURRENT PREGNANCY   Current Age: 24 year old   Age at Delivery: 24 year old  MARIAN: 2025, by Last Menstrual Period                                   Gestational Age: 13w2d  This pregnancy is a single gestation.   This pregnancy was conceived spontaneously.    MEDICAL HISTORY   Roula s reported medical history is not expected to impact pregnancy management or risks to fetal development.       FAMILY HISTORY   A three-generation pedigree was obtained today and is scanned under the \"Media\" tab in Epic. The family history was reported by Roula and their partner.    The following significant findings were reported today:   Roula's partner, Denver, is currently 26 years old. Denver reports that he has a history of rheumatoid arthritis that was diagnosed at 13 years of age. Of note, Denver's mother has a history of Crohn's disease. We reviewed this family history. This condition, and other autoimmune disorders, are multifactorial in nature, resulting from both genetic and non-genetic factors. Once an autoimmune disease is present in a family, other relatives may be at increased risk to develop the same disease or a different autoimmune disease.  Presymptomatic and prenatal testing are not available for autoimmune disorders.    Denver reports that he had a brother who passed away after just a few days of life due to congenital bilateral polycystic kidney disease. Bilateral multicystic kidneys is typically life-limiting either in utero or in the  period. Chromosomal abnormalities (mainly trisomy 18) can be seen with multicystic kidneys. Per the [employer], there is a 15% chance for a chromosome abnormality with bilateral lesions. Otherwise, there can be an associated syndrome in 10% of cases. Examples of syndromes or associations that can involve multicystic kidneys include: branchio-otorenal syndrome (autosomal dominant), VACTERL association (sporadic), short-rib polydactyly " syndrome (autosomal recessive), Meckel-Renuka syndrome (autosomal recessive), and autosomal recessive polycystic kidney disease (ARPKD). In general, differences in fetal development can potentially be due genetic differences such as whole chromosome aneuploidy, extra or missing pieces of chromosomes (copy number variants), or single gene conditions. Some carrier screening panels may include screening for some of these conditions.Carrier screening was discussed in depth today and the couple was uncertain if they would like to pursue testing. Information regarding carrier screening as well as my contact information was provided to review. The couple with contact me if they elect to pursue screening.     Roula reports that there is a family history of breast cancer in her maternal aunt (diagnosed around age 70, passed away 1 year later), pancreatic cancer in her maternal uncle (diagnosed and passed away in his mid 60s), as well as a history of throat cancer in her maternal uncle with a history of tobacco use. There was also a notable family history of unspecified cancer in 3 of Roula's 9 paternal aunt/uncles No other family history of cancer was reported. We reviewed that most cancer seen in families occurs sporadically, but a smaller proportion may be due to an underlying genetic etiology. Pancreatic cancer in particular is recommended to consider cancer genetic testing for the family, however individuals most closely related to those with the diagnosis (if the person affected with cancer is no longer available for testing) is generally considered most informative for cancer genetic testing. Recommended reviewing the family history of cancer with a primary care physician so that Roula can undergo appropriate cancer surveillance moving forward, as needed. We also encourage all patients to reach out if there any changes or updates to the family history that may impact clinical management. If the family wants more  "information they can contact the [facility] Cancer Risk Management Program ([phone]). Physicians can also make referrals at [email or web address] or, if within the [place] system, through [facility] referral for \"Cancer Risk Mgmt/Cancer Genetic Counseling\".      Roula reports that her paternal aunt passed away from a congenital heart defect around 10 years of age. She believes it was a difference involving the aorta, possible coarctation of the aorta, but was not certain. We discussed that congenital heart defects are common, occurring in 0.5 to 1.0% live births. Congenital heart defects can be isolated or part of a broader genetic syndrome. When isolated, congenital heart defects are usually multifactorial, meaning they are often caused by a combination of genetic and environmental factors. In general, the recurrence risk is likely increased for first and second degree relatives of an individual with a congenital heart defect. This pregnancy is a 3rd degree relative to the family member; therefore, the recurrence risk is likely equal to the population risk.     Otherwise, the reported family history is unremarkable for multiple miscarriages, stillbirths, birth defects, intellectual disabilities, known genetic conditions, and consanguinity.       RISK ASSESSMENT FOR INHERITED CONDITIONS AND CARRIER SCREENING OPTIONS   Expanded carrier screening is available to screen for autosomal recessive conditions and X-linked conditions in a large list of genes. Carrier screening does not test the pregnancy but gives a risk assessment for the pregnancy and future pregnancies to have the condition. Expanded carrier screening is designed to identify carrier status for conditions that are primarily childhood or adolescent onset. Expanded carrier screening does not evaluate for adult-onset conditions such as hereditary cancer syndromes, dementia/ Alzheimer's disease, or " cardiovascular disease risk factors. Additionally, expanded carrier screening is not comprehensive for all known genetic diseases or inherited conditions. Carrier screening does not test for all genetic and health conditions or risk factors.     Autosomal recessive conditions happen when a mutation has been inherited from the egg and sperm and include conditions like cystic fibrosis, thalassemia, hearing loss, spinal muscular atrophy, and more. We reviewed that when both biological parents carry a harmful genetic change in a gene associated with autosomal recessive inheritance, each of their pregnancies has a 1 in 4 (25%) chance to be affected by that condition. X-linked conditions happen when a mutation has been inherited from the egg and include conditions like fragile X syndrome.With x-linked conditions, the specific risk generally depends on the chromosomal sex of the fetus, with XY individuals (generally male) being most severely affected.      screening was reviewed. About [place]  Screening    The family does not have a history of a genetic disease with confirmed molecular testing, however, Roula's partner, Denver, reports that he had a brother pass away after a few days of life following a pregnancy that was complicated by bilateral polycytic kidneys. We discussed this history and how certain genetic conditions (such as autosomal recessive polycystic kidney disease), can be associated with this prenatal finding. Carrier screening for this condition as well as other genetic conditions is available.     The patient has not had carrier screening previously.     The patient was not certain about whether to pursue carrier screening today. They will contact us if they would like to pursue screening. See below for the more detailed information we discussed.  Carrier screening does not test the pregnancy but gives a risk assessment for the pregnancy and future pregnancies to have the condition. The only  way to determine with absolutely certainty whether if a pregnancy is affected with a  genetic condition is through diagnostic testing such as a chorionic villus sampling or amniocentesis. Other options would include testing baby after delivery.   There are different size panels or list of conditions for carrier screening offered by different labs. We would discuss what panel/lab would be the best fit for the family if the couple decides to pursue screening.    Some conditions cause health problems for carriers. We discussed that in the event of an incidental finding, further evaluation regarding screening or further testing may be recommended.   We reviewed that there is a law in place, the Genetic Information Nondiscrimination Act (ALDO), that protects patients from discrimination by health insurance companies and employers based on their genetic information. [place]DO does not protect against discrimination by life insurance companies or disability insurance.  Carrier screening does not test for all genetic and health conditions or risk factors  There are limitations to current technology and results may be updated at a later date  The results typically take 2-3 weeks.   If an individual is a carrier, family members could be as well. The patient is encouraged to share this information with relatives.  The lab will communicate the out-of-pocket cost with the patient. The option of a $249 ([facility]) or $349 ([facility]) self pay, which needs to be designated before billing insurance was also discussed    Information regarding both [facility] carrier screening (267 gene panel) as well as [facility] carrier screening (574 gene panel) was provided to the patient and her partner, as well as my contact information. The couple is aware that carrier screening can be done at any point in their reproductive journey. They will contact me if they decide they would like to pursue screening.     RISK ASSESSMENT FOR CHROMOSOME CONDITIONS   We  explained that the risk for fetal chromosome abnormalities increases with maternal age. We discussed specific features of common chromosome abnormalities, including trisomy 21 (Down syndrome), trisomy 13, trisomy 18, and sex chromosome trisomies.    At age 24 at midtrimester, the risk to have a baby with Down syndrome is 1 in 1040.  At age 24 at midtrimester, the risk to have a baby with any chromosome abnormality is 1 in 520.   At age 24 at delivery, the risk to have a baby with Down syndrome is 1 in 1250.   At age 24 at delivery, the risk to have a baby with any chromosome abnormality is 1 in 476.    Roula has not had genetic screening in this pregnancy but elected to have screening today.      GENETIC TESTING OPTIONS   Genetic testing during a pregnancy includes screening and diagnostic procedures.      Screening tests are non-invasive which means no risk to the pregnancy and includes ultrasounds and blood work. The benefits and limitations of screening were reviewed. Screening tests provide a risk assessment (chance) specific to the pregnancy for certain fetal chromosome abnormalities but cannot definitively diagnose or exclude a fetal chromosome abnormality. Follow-up genetic counseling and consideration of diagnostic testing is recommended with any abnormal screening result. Diagnostic testing during a pregnancy is more certain and can test for more conditions. However, the tests do have a risk of miscarriage that requires careful consideration. These tests can detect fetal chromosome abnormalities with greater than 99% certainty. Results can be compromised by maternal cell contamination or mosaicism and are limited by the resolution of current genetic testing technology.     There is no screening or diagnostic test that detects all forms of birth defects or intellectual disability.     We discussed the following screening options:     Non-invasive prenatal testing (NIPT)  Also called cell-free DNA screening  because it detects chromosomes from the placenta in the pregnant person's blood  Can be done any time after 10 weeks gestation  Standard recommendation for NIPT screens for trisomy 21, trisomy 18, trisomy 13, with the option of adding sex chromosome aneuploidies, without or without predicted sex  Cannot screen for open neural tube defects, maternal serum AFP after 15 weeks is recommended  New NIPT options include screening for other trisomies, microdeletion syndromes, and in some cases fetal blood antigens. Guidelines do not recommend these conditions are included in standard screening. These options have limitations and should be discussed with a genetic counselor.   However, current (2023) ACMG guidelines do recommend that screening for one microdeletion syndrome, called 22q11.2 deletion syndrome be offered to all pregnant patients. 22q11.2 deletion syndrome has an estimated prevalence of 1 in 990 to 1 in 2148 (0.05-0.1%). Risk is not thought to increase with maternal age. Clinical features are variable but include congenital heart defects, cleft palate, developmental delays, immune system deficiencies, and hearing loss. Approximately 90% of cases are de lc (a sporadic new change in a pregnancy). Cell-free DNA screening for 22q11.2 deletion syndrome is available with the inclusion of other microdeletion syndromes. There is less data about the performance of cell-free DNA screening for more rare microdeletions and the chance for false positives or negative may be increased.  We discussed the limitations of cell-free DNA screening in detecting microdeletions and the possibility of false positives and false negatives. The patient declined microdeletion syndrome screening.    Carrier screening  Risk assessment for certain autosomal recessive and x-linked conditions. These conditions are generally infantile- or childhood-onset conditions.   Can be done any time during the pregnancy or prior to pregnancy.  Can screen  for over many different genetic conditions, panel size ranging by lab.  Is not intended to diagnosis a condition in the carrier parent.    Even with negative results, a residual risk for screened conditions remains.       We discussed the following ultrasound options:    Nuchal translucency (NT) ultrasound  Ultrasound between [id number] that includes nuchal translucency measurement and nasal bone assessments  Nuchal translucency refers to the space at the back of the neck where fluid builds up. All babies at this stage have fluid and there is only concern if there is too much fluid  Nasal bone refers to the small bone in the nose. There is concern for conditions like Down syndrome if the bone cannot be seen at all  This ultrasound can be done as part of first trimester screening, at the same time as another screen (NIPT), at the same time as a [facility], or if the patients does not want genetic screening.  Markers on ultrasound detects about 70% of pregnancies with aneuploidy  Abnormalities on NT ultrasound can also increase the risk for a birth defect, like a heart defect    Comprehensive level II ultrasound (Fetal Anatomy Ultrasound)  Ultrasound done between 18-20 weeks gestation  Screens for major birth defects and markers for aneuploidy (like trisomy 21 and trisomy 18)  Includes looking at the fetus/baby's growth, heart, organs (stomach, kidneys), placenta, and amniotic fluid    We discussed the following diagnostic options:     Chorionic villus sampling (CVS)  Invasive diagnostic procedure done between 10w0d and 13w6d  The procedure collects a small sample from the placenta for the purpose of chromosomal testing and/or other genetic testing  Diagnostic result; more than 99% sensitivity for fetal chromosome abnormalities  Cannot screen for open neural tube defects, maternal serum AFP after 15 weeks is recommended    Amniocentesis  Invasive diagnostic procedure done after 15 weeks gestation  The procedure collects a  small sample of amniotic fluid for the purpose of chromosomal testing and/or other genetic testing  Diagnostic result; more than 99% sensitivity for fetal chromosome abnormalities  Testing for AFP in the amniotic fluid can test for open neural tube defects    It was a pleasure to be involved with Roula s care. Time spent on the day of encounter was 60 minutes.    Whitney Gonzalez GC, MS, [facility]  Board Certified and [place] Licensed Genetic Counselor  [facility]  Maternal Fetal Medicine  Office: [phone]  [facility]: [phone]   Fax: [phone]  [facility]